# Patient Record
Sex: FEMALE | Race: WHITE | NOT HISPANIC OR LATINO | Employment: UNEMPLOYED | URBAN - METROPOLITAN AREA
[De-identification: names, ages, dates, MRNs, and addresses within clinical notes are randomized per-mention and may not be internally consistent; named-entity substitution may affect disease eponyms.]

---
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
## 2024-01-25 ENCOUNTER — OFFICE VISIT (OUTPATIENT)
Dept: URGENT CARE | Facility: CLINIC | Age: 24
End: 2024-01-25
Payer: COMMERCIAL

## 2024-01-25 VITALS
RESPIRATION RATE: 18 BRPM | HEART RATE: 101 BPM | BODY MASS INDEX: 19.63 KG/M2 | TEMPERATURE: 98 F | OXYGEN SATURATION: 97 % | DIASTOLIC BLOOD PRESSURE: 79 MMHG | HEIGHT: 64 IN | WEIGHT: 115 LBS | SYSTOLIC BLOOD PRESSURE: 121 MMHG

## 2024-01-25 DIAGNOSIS — N39.0 URINARY TRACT INFECTION WITHOUT HEMATURIA, SITE UNSPECIFIED: Primary | ICD-10-CM

## 2024-01-25 LAB
SL AMB  POCT GLUCOSE, UA: NORMAL
SL AMB LEUKOCYTE ESTERASE,UA: NORMAL
SL AMB POCT BILIRUBIN,UA: NORMAL
SL AMB POCT BLOOD,UA: NORMAL
SL AMB POCT CLARITY,UA: NORMAL
SL AMB POCT COLOR,UA: NORMAL
SL AMB POCT KETONES,UA: NORMAL
SL AMB POCT NITRITE,UA: NORMAL
SL AMB POCT PH,UA: 8.5
SL AMB POCT SPECIFIC GRAVITY,UA: 1.03
SL AMB POCT URINE HCG: NORMAL
SL AMB POCT URINE PROTEIN: NORMAL
SL AMB POCT UROBILINOGEN: 0.2

## 2024-01-25 PROCEDURE — 81002 URINALYSIS NONAUTO W/O SCOPE: CPT | Performed by: PHYSICIAN ASSISTANT

## 2024-01-25 PROCEDURE — 87591 N.GONORRHOEAE DNA AMP PROB: CPT | Performed by: PHYSICIAN ASSISTANT

## 2024-01-25 PROCEDURE — 87086 URINE CULTURE/COLONY COUNT: CPT | Performed by: PHYSICIAN ASSISTANT

## 2024-01-25 PROCEDURE — 99213 OFFICE O/P EST LOW 20 MIN: CPT | Performed by: PHYSICIAN ASSISTANT

## 2024-01-25 PROCEDURE — 87491 CHLMYD TRACH DNA AMP PROBE: CPT | Performed by: PHYSICIAN ASSISTANT

## 2024-01-25 PROCEDURE — 81025 URINE PREGNANCY TEST: CPT | Performed by: PHYSICIAN ASSISTANT

## 2024-01-25 RX ORDER — FEXOFENADINE HCL 180 MG/1
180 TABLET ORAL DAILY
COMMUNITY

## 2024-01-25 RX ORDER — SULFAMETHOXAZOLE AND TRIMETHOPRIM 800; 160 MG/1; MG/1
1 TABLET ORAL EVERY 12 HOURS SCHEDULED
Qty: 14 TABLET | Refills: 0 | Status: SHIPPED | OUTPATIENT
Start: 2024-01-25 | End: 2024-02-01

## 2024-01-25 NOTE — PROGRESS NOTES
[facility]        NAME: Chayo Salazar is a 23 y.o. female  : 2000    MRN: [id number]  DATE: 2024  TIME: 5:44 PM    Assessment and Plan   Urinary tract infection without hematuria, site unspecified [N39.0]  1. Urinary tract infection without hematuria, site unspecified  Urine culture    Chlamydia/GC amplified DNA by PCR    Chlamydia/GC amplified DNA by PCR    sulfamethoxazole-trimethoprim (BACTRIM DS) 800-160 mg per tablet    POCT urine dip    POCT urine HCG            Patient Instructions     Increase fluid intake  Take medicine as prescribed  Follow up with PCP in 3-5 days.  Proceed to  ER if symptoms worsen.  I will call you if there is a discrepancy with your urine culture    Chief Complaint     Chief Complaint   Patient presents with    Possible UTI     Urine urgency, pain in bladder with blood clots x 2 days   Took AZO.          History of Present Illness       Urinary Tract Infection   This is a new problem. Episode onset: 2 days ago. The problem has been gradually worsening. There has been no fever. Associated symptoms include chills, flank pain, frequency, hematuria (one episode of blood clot), sweats and urgency. Pertinent negatives include no discharge, hesitancy, nausea, possible pregnancy (period ended on saturday) or vomiting. Associated symptoms comments: Dull flank pain bilaterally; no new sexual partners. Treatments tried: azo. Her past medical history is significant for recurrent UTIs (per patient, seen by multiple providers with multiple diagnosises).       Review of Systems   Review of Systems   Constitutional:  Positive for chills. Negative for diaphoresis, fatigue and fever.   Gastrointestinal:  Negative for nausea and vomiting.   Genitourinary:  Positive for dysuria, flank pain, frequency, hematuria (one episode of blood clot) and urgency. Negative for difficulty urinating, dyspareunia, enuresis, hesitancy, pelvic pain, vaginal bleeding, vaginal discharge and  "vaginal pain.         Current Medications       Current Outpatient Medications:     fexofenadine (ALLEGRA) 180 MG tablet, Take 180 mg by mouth daily, Disp: , Rfl:     sulfamethoxazole-trimethoprim (BACTRIM DS) 800-160 mg per tablet, Take 1 tablet by mouth every 12 (twelve) hours for 7 days, Disp: 14 tablet, Rfl: 0    predniSONE 10 mg tablet, Take 5 pills day one, 4 pills day 2, 3 pills day 3, 2 pills day 4, 1 pills day 5 (Patient not taking: Reported on 1/25/2024), Disp: 15 tablet, Rfl: 0    Current Allergies     Allergies as of 01/25/2024 - Reviewed 01/25/2024   Allergen Reaction Noted    Penicillins Hives 08/21/2019            The following portions of the patient's history were reviewed and updated as appropriate: allergies, current medications, past family history, past medical history, past social history, past surgical history and problem list.     Past Medical History:   Diagnosis Date    Eczema        Past Surgical History:   Procedure Laterality Date    NO PAST SURGERIES         History reviewed. No pertinent family history.      Medications have been verified.        Objective   /79   Pulse 101   Temp 98 °F (36.7 °C) (Tympanic)   Resp 18   Ht 5' 4\" (1.626 m)   Wt 52.2 kg (115 lb)   LMP 01/12/2024   SpO2 97%   BMI 19.74 kg/m²   Patient's last menstrual period was 01/12/2024.       Physical Exam     Physical Exam  Constitutional:       Appearance: She is well-developed.   HENT:      Head: Normocephalic.      Right Ear: Hearing normal.      Left Ear: Hearing normal.      Nose: No mucosal edema.      Mouth/Throat:      Tonsils: No tonsillar exudate.   Cardiovascular:      Rate and Rhythm: Normal rate and regular rhythm.      Heart sounds: Normal heart sounds. No murmur heard.     No friction rub. No gallop.   Pulmonary:      Effort: Pulmonary effort is normal. No respiratory distress.      Breath sounds: Normal breath sounds. No stridor. No decreased breath sounds, wheezing, rhonchi or rales. "   Abdominal:      General: Bowel sounds are normal. There is no distension.      Palpations: Abdomen is soft. There is no mass.      Tenderness: There is abdominal tenderness in the suprapubic area. There is right CVA tenderness and left CVA tenderness (mild, diffuse flank pain). There is no guarding or rebound.   Lymphadenopathy:      Cervical: No cervical adenopathy.      Right cervical: No superficial cervical adenopathy.     Left cervical: No superficial cervical adenopathy.

## 2024-01-26 LAB
BACTERIA UR CULT: NORMAL
C TRACH DNA SPEC QL NAA+PROBE: NEGATIVE
N GONORRHOEA DNA SPEC QL NAA+PROBE: NEGATIVE

## 2024-02-24 NOTE — PROGRESS NOTES
Assessment/Plan:  Problem List Items Addressed This Visit    None  Visit Diagnoses       Recurrent UTI    -  Primary    Relevant Orders    Urine culture    Urinalysis with microscopic    US kidney and bladder    UTI symptoms        Relevant Orders    Vaginosis DNA Probe    Chlamydia/GC amplified DNA by PCR    Vaginal discharge        Relevant Orders    Vaginosis DNA Probe    Chlamydia/GC amplified DNA by PCR    Screen for STD (sexually transmitted disease)        Relevant Orders    Vaginosis DNA Probe    Chlamydia/GC amplified DNA by PCR    Dysmenorrhea        Relevant Orders    US pelvis complete w transvaginal             Patient with history of recurrent urinary tract infection symptoms.  Discussed recurrent UTI versus interstitial cystitis versus endometriosis.  STD screening was ordered including vaginitis probe to rule out infectious causes.  Urinalysis and urine culture was ordered.  Kidney, bladder ultrasound was ordered including pelvic ultrasound.  She does have a family history of endometriosis.  Discussed definitive diagnosis with diagnostic laparoscopy.  Also discussed potential referral to urology due to history of recurrent UTI and to rule out interstitial cystitis.  She declines referral for now.  Will arrange additional follow-up and testing once results are obtained.  We will call her with results and arrange next follow-up.        Subjective   Patient ID: Chayo Salazar is a 23 y.o. female.    Patient is here for a problem visit.    Chief Complaint   Patient presents with    Female  Problem     Here for a problem visit.  She reports UTI symptoms around periods.    She reports urgency, burning with bladder pain.    She has also been seeing blood in her urine.    Sexually active since age 18, has had 7 partners.  She has been with current x 3 years    PAP 7/18/23 normal  Negative GC/CT last year    Previously seen by Kamala Wyman    No urine tests    She has been getting AZO and has also been on  "antibiotics 2-3 x. States this has been reoccurring.  She has normal vaginal discharge, no odor.    She has no pain with intercourse.  She has pain with periods and also cramping without periods.    She reports regular periods, occurring every month.  She states periods are not heavy.    She is busing condoms for contraception.    Menstrual History:  OB History          0    Para   0    Term   0       0    AB   0    Living   0         SAB   0    IAB   0    Ectopic   0    Multiple   0    Live Births   0           Obstetric Comments   Menarche: 14                No LMP recorded.         Past Medical History:   Diagnosis Date    Eczema        Past Surgical History:   Procedure Laterality Date    NO PAST SURGERIES      WISDOM TOOTH EXTRACTION         Social History     Tobacco Use    Smoking status: Never    Smokeless tobacco: Never   Substance Use Topics    Alcohol use: Yes     Comment: Occasional        Allergies   Allergen Reactions    Cephalexin Other (See Comments)    Penicillins Hives         Current Outpatient Medications:     fexofenadine (ALLEGRA) 180 MG tablet, Take 180 mg by mouth daily, Disp: , Rfl:     Lactobacillus (AZO Complete Feminine Balance) CAPS, Take by mouth, Disp: , Rfl:       Review of Systems   Constitutional: Negative.    HENT: Negative.     Eyes: Negative.    Respiratory: Negative.     Cardiovascular: Negative.    Gastrointestinal: Negative.    Endocrine: Negative.    Genitourinary:         As noted in HPI   Musculoskeletal: Negative.    Skin: Negative.    Allergic/Immunologic: Negative.    Neurological: Negative.    Hematological: Negative.    Psychiatric/Behavioral: Negative.           /70 (BP Location: Left arm, Patient Position: Sitting, Cuff Size: Adult)   Pulse (!) 110   Temp 98.4 °F (36.9 °C)   Ht 5' 4.5\" (1.638 m)   Wt 57.4 kg (126 lb 9.6 oz)   SpO2 100%   BMI 21.40 kg/m²       Physical Exam  Constitutional:       General: She is not in acute distress.     " Appearance: She is well-developed.   Genitourinary:      Bladder and urethral meatus normal.      No lesions in the vagina.      Right Labia: No rash, tenderness, lesions, skin changes or Bartholin's cyst.     Left Labia: No tenderness, lesions, skin changes, Bartholin's cyst or rash.     No vaginal discharge, erythema, tenderness or bleeding.      No vaginal prolapse present.     No vaginal atrophy present.       Right Adnexa: not tender, not full and no mass present.     Left Adnexa: not tender, not full and no mass present.     No cervical polyp.      Uterus is not enlarged or tender.      No uterine mass detected.     Pelvic exam was performed with patient in the lithotomy position.   Rectum:      No tenderness.   Cardiovascular:      Rate and Rhythm: Normal rate.   Pulmonary:      Effort: Pulmonary effort is normal.   Abdominal:      General: There is no distension.      Palpations: Abdomen is soft.      Tenderness: There is no abdominal tenderness.   Neurological:      Mental Status: She is alert and oriented to person, place, and time.   Skin:     General: Skin is warm and dry.   Psychiatric:         Behavior: Behavior normal.             I have spent a total time of 30 minutes on 02/26/24 in caring for this patient including Instructions for management, Impressions, Counseling / Coordination of care, Documenting in the medical record, Reviewing / ordering tests, medicine, procedures  , Obtaining or reviewing history  , and Communicating with other healthcare professionals .       No future appointments.

## 2024-02-26 ENCOUNTER — OFFICE VISIT (OUTPATIENT)
Dept: GYNECOLOGY | Facility: CLINIC | Age: 24
End: 2024-02-26

## 2024-02-26 VITALS
HEIGHT: 65 IN | DIASTOLIC BLOOD PRESSURE: 70 MMHG | HEART RATE: 110 BPM | BODY MASS INDEX: 21.09 KG/M2 | TEMPERATURE: 98.4 F | WEIGHT: 126.6 LBS | SYSTOLIC BLOOD PRESSURE: 116 MMHG | OXYGEN SATURATION: 100 %

## 2024-02-26 DIAGNOSIS — N39.0 RECURRENT UTI: Primary | ICD-10-CM

## 2024-02-26 DIAGNOSIS — Z11.3 SCREEN FOR STD (SEXUALLY TRANSMITTED DISEASE): ICD-10-CM

## 2024-02-26 DIAGNOSIS — N89.8 VAGINAL DISCHARGE: ICD-10-CM

## 2024-02-26 DIAGNOSIS — N94.6 DYSMENORRHEA: ICD-10-CM

## 2024-02-26 DIAGNOSIS — R39.9 UTI SYMPTOMS: ICD-10-CM

## 2024-02-26 LAB
BACTERIA UR QL AUTO: ABNORMAL /HPF
BILIRUB UR QL STRIP: NEGATIVE
CLARITY UR: CLEAR
COLOR UR: COLORLESS
GLUCOSE UR STRIP-MCNC: NEGATIVE MG/DL
HGB UR QL STRIP.AUTO: NEGATIVE
KETONES UR STRIP-MCNC: NEGATIVE MG/DL
LEUKOCYTE ESTERASE UR QL STRIP: NEGATIVE
MUCOUS THREADS UR QL AUTO: ABNORMAL
NITRITE UR QL STRIP: NEGATIVE
NON-SQ EPI CELLS URNS QL MICRO: ABNORMAL /HPF
PH UR STRIP.AUTO: 7.5 [PH]
PROT UR STRIP-MCNC: NEGATIVE MG/DL
RBC #/AREA URNS AUTO: ABNORMAL /HPF
SP GR UR STRIP.AUTO: 1.01 (ref 1–1.03)
UROBILINOGEN UR STRIP-ACNC: <2 MG/DL
WBC #/AREA URNS AUTO: ABNORMAL /HPF

## 2024-02-26 PROCEDURE — 87591 N.GONORRHOEAE DNA AMP PROB: CPT | Performed by: OBSTETRICS & GYNECOLOGY

## 2024-02-26 PROCEDURE — 81001 URINALYSIS AUTO W/SCOPE: CPT | Performed by: OBSTETRICS & GYNECOLOGY

## 2024-02-26 PROCEDURE — 99203 OFFICE O/P NEW LOW 30 MIN: CPT | Performed by: OBSTETRICS & GYNECOLOGY

## 2024-02-26 PROCEDURE — 87086 URINE CULTURE/COLONY COUNT: CPT | Performed by: OBSTETRICS & GYNECOLOGY

## 2024-02-26 PROCEDURE — 87510 GARDNER VAG DNA DIR PROBE: CPT | Performed by: OBSTETRICS & GYNECOLOGY

## 2024-02-26 PROCEDURE — 87491 CHLMYD TRACH DNA AMP PROBE: CPT | Performed by: OBSTETRICS & GYNECOLOGY

## 2024-02-26 PROCEDURE — 87660 TRICHOMONAS VAGIN DIR PROBE: CPT | Performed by: OBSTETRICS & GYNECOLOGY

## 2024-02-26 PROCEDURE — 87480 CANDIDA DNA DIR PROBE: CPT | Performed by: OBSTETRICS & GYNECOLOGY

## 2024-02-26 RX ORDER — PUMPKIN SEED EXTRACT/SOY GERM 300 MG
CAPSULE ORAL
COMMUNITY

## 2024-02-27 LAB
BACTERIA UR CULT: NORMAL
C TRACH DNA SPEC QL NAA+PROBE: NEGATIVE
CANDIDA RRNA VAG QL PROBE: NOT DETECTED
G VAGINALIS RRNA GENITAL QL PROBE: DETECTED
N GONORRHOEA DNA SPEC QL NAA+PROBE: NEGATIVE
T VAGINALIS RRNA GENITAL QL PROBE: NOT DETECTED

## 2024-02-28 ENCOUNTER — TELEPHONE (OUTPATIENT)
Dept: OTHER | Facility: HOSPITAL | Age: 24
End: 2024-02-28

## 2024-02-28 DIAGNOSIS — B96.89 BV (BACTERIAL VAGINOSIS): Primary | ICD-10-CM

## 2024-02-28 DIAGNOSIS — N76.0 BV (BACTERIAL VAGINOSIS): Primary | ICD-10-CM

## 2024-02-28 RX ORDER — METRONIDAZOLE 500 MG/1
500 TABLET ORAL EVERY 12 HOURS SCHEDULED
Qty: 14 TABLET | Refills: 0 | Status: SHIPPED | OUTPATIENT
Start: 2024-02-28 | End: 2024-03-06

## 2024-05-22 ENCOUNTER — OFFICE VISIT (OUTPATIENT)
Dept: URGENT CARE | Facility: CLINIC | Age: 24
End: 2024-05-22
Payer: COMMERCIAL

## 2024-05-22 VITALS
TEMPERATURE: 97.2 F | WEIGHT: 123 LBS | OXYGEN SATURATION: 97 % | SYSTOLIC BLOOD PRESSURE: 112 MMHG | HEIGHT: 64 IN | BODY MASS INDEX: 21 KG/M2 | DIASTOLIC BLOOD PRESSURE: 70 MMHG | RESPIRATION RATE: 18 BRPM | HEART RATE: 87 BPM

## 2024-05-22 DIAGNOSIS — H92.09 EAR ACHE: ICD-10-CM

## 2024-05-22 DIAGNOSIS — H61.21 HEARING LOSS OF RIGHT EAR DUE TO CERUMEN IMPACTION: Primary | ICD-10-CM

## 2024-05-22 DIAGNOSIS — H66.90 EAR INFECTION: ICD-10-CM

## 2024-05-22 PROCEDURE — 69210 REMOVE IMPACTED EAR WAX UNI: CPT

## 2024-05-22 PROCEDURE — 99213 OFFICE O/P EST LOW 20 MIN: CPT

## 2024-05-22 RX ORDER — MULTIVITAMIN
1 TABLET ORAL DAILY
COMMUNITY

## 2024-05-22 RX ORDER — OFLOXACIN 3 MG/ML
5 SOLUTION AURICULAR (OTIC) 2 TIMES DAILY
Qty: 10 ML | Refills: 0 | Status: SHIPPED | OUTPATIENT
Start: 2024-05-22

## 2024-05-22 RX ORDER — OFLOXACIN 3 MG/ML
5 SOLUTION AURICULAR (OTIC) 2 TIMES DAILY
Qty: 10 ML | Refills: 0 | Status: SHIPPED | OUTPATIENT
Start: 2024-05-22 | End: 2024-05-22

## 2024-05-22 NOTE — PROGRESS NOTES
[facility]        NAME: Chayo Salazar is a 24 y.o. female  : 2000    MRN: [id number]  DATE: May 22, 2024  TIME: 2:32 PM    Assessment and Plan   Hearing loss of right ear due to cerumen impaction [H61.21]  1. Hearing loss of right ear due to cerumen impaction  Ear cerumen removal      2. Ear ache  ofloxacin (FLOXIN) 0.3 % otic solution    Ear cerumen removal    DISCONTINUED: ofloxacin (FLOXIN) 0.3 % otic solution      3. Ear infection  ofloxacin (FLOXIN) 0.3 % otic solution    DISCONTINUED: ofloxacin (FLOXIN) 0.3 % otic solution            Patient Instructions       Follow up with PCP in 3-5 days.  Proceed to  ER if symptoms worsen.    If tests are performed, our office will contact you with results only if changes need to made to the care plan discussed with you at the visit. You can review your full results on [facility].    Chief Complaint     Chief Complaint   Patient presents with   • Earache     Right ear pain with ringing in ears since Friday          History of Present Illness       Patient here with her mom. Patient here with right ear pain and ringing in her ear. Onset was last Friday. She just returned this puta conta. History of ear infection but reports it feels different. Patient use OTC drops without improvement.         Review of Systems   Review of Systems   Constitutional:  Negative for chills and fever.   HENT:  Positive for ear pain and tinnitus. Negative for congestion, ear discharge, rhinorrhea, sore throat and trouble swallowing.    Respiratory:  Negative for cough, chest tightness and shortness of breath.    Gastrointestinal:  Negative for abdominal pain, constipation, diarrhea, nausea and vomiting.   Skin:  Negative for color change and rash.   Neurological:  Negative for dizziness, light-headedness and headaches.         Current Medications       Current Outpatient Medications:   •  fexofenadine (ALLEGRA) 180 MG tablet, Take 180 mg by mouth daily, Disp: , Rfl:  "  •  Multiple Vitamin (multivitamin) tablet, Take 1 tablet by mouth daily, Disp: , Rfl:   •  ofloxacin (FLOXIN) 0.3 % otic solution, Administer 5 drops to the right ear 2 (two) times a day, Disp: 10 mL, Rfl: 0  •  Lactobacillus (AZO Complete Feminine Balance) CAPS, Take by mouth (Patient not taking: Reported on 5/22/2024), Disp: , Rfl:     Current Allergies     Allergies as of 05/22/2024 - Reviewed 05/22/2024   Allergen Reaction Noted   • Cephalexin Other (See Comments) 03/10/2004   • Penicillins Hives 08/21/2019            The following portions of the patient's history were reviewed and updated as appropriate: allergies, current medications, past family history, past medical history, past social history, past surgical history and problem list.     Past Medical History:   Diagnosis Date   • Eczema        Past Surgical History:   Procedure Laterality Date   • NO PAST SURGERIES     • WISDOM TOOTH EXTRACTION         Family History   Problem Relation Age of Onset   • Endometriosis Maternal Aunt          Medications have been verified.        Objective   /70   Pulse 87   Temp (!) 97.2 °F (36.2 °C) (Temporal)   Resp 18   Ht 5' 4\" (1.626 m)   Wt 55.8 kg (123 lb)   SpO2 97%   BMI 21.11 kg/m²        Physical Exam     Physical Exam  Vitals and nursing note reviewed.   Constitutional:       General: She is not in acute distress.     Appearance: Normal appearance. She is normal weight. She is not ill-appearing.   HENT:      Head: Normocephalic and atraumatic.      Right Ear: Ear canal and external ear normal. Swelling present. There is impacted cerumen.      Left Ear: Tympanic membrane, ear canal and external ear normal.      Ears:      Comments: Moderate amount of build up noted in the right canal, obscuring TM. Discussed removal. Attempted to remove manually with light curette. Full removal unsuccessful with curette. TM appears intact without perforation (the portion which is visualized). Irrigation used to " "remove remainder of build up.     Build up was dry and ribbon like. Erythema noted in the canal. Will treat with drops given abnormal build up, and erythema. Discussed with patient who is agreeable to same.      Nose: Nose normal.      Mouth/Throat:      Lips: Pink.      Mouth: Mucous membranes are moist.      Pharynx: Oropharynx is clear.   Eyes:      Extraocular Movements: Extraocular movements intact.      Conjunctiva/sclera: Conjunctivae normal.      Pupils: Pupils are equal, round, and reactive to light.   Cardiovascular:      Rate and Rhythm: Normal rate and regular rhythm.      Pulses: Normal pulses.      Heart sounds: Normal heart sounds.   Pulmonary:      Effort: Pulmonary effort is normal.      Breath sounds: Normal breath sounds.   Abdominal:      General: Abdomen is flat. Bowel sounds are normal.      Palpations: Abdomen is soft.   Musculoskeletal:         General: Normal range of motion.      Cervical back: Full passive range of motion without pain, normal range of motion and neck supple.   Skin:     General: Skin is warm.      Capillary Refill: Capillary refill takes less than 2 seconds.      Findings: No rash.   Neurological:      General: No focal deficit present.      Mental Status: She is alert and oriented to person, place, and time.   Psychiatric:         Mood and Affect: Mood normal.         Behavior: Behavior normal.         Ear cerumen removal    Date/Time: 5/22/2024 2:00 PM    Performed by: KMI Melton  Authorized by: KIM Melton  Universal Protocol:  Consent: Verbal consent obtained.  Risks and benefits: risks, benefits and alternatives were discussed  Consent given by: patient  Time out: Immediately prior to procedure a \"time out\" was called to verify the correct patient, procedure, equipment, support staff and site/side marked as required.  Patient understanding: patient states understanding of the procedure being performed  Patient consent: the patient's " understanding of the procedure matches consent given  Procedure consent: procedure consent matches procedure scheduled  Patient identity confirmed: verbally with patient    Patient location:  Clinic  Indications / Diagnosis:  Cerumen impaction  Procedure details:     Local anesthetic:  None    Location:  R ear    Procedure type: irrigation with instrumentation      Instrumentation: curette      Approach:  Natural orifice  Post-procedure details:     Complication:  None    Hearing quality:  Improved    Patient tolerance of procedure:  Tolerated well, no immediate complications  Comments:      Patient reports the muffled hearing was resolved following procedure.

## 2024-05-22 NOTE — PATIENT INSTRUCTIONS
You may take over the counter Tylenol (Acetaminophen) and/or Motrin (Ibuprofen) as needed, as directed on packaging.   Be sure to get plenty of rest, and drinking fluids to remain hydrated.     Please follow up with your primary provider in the next several days. Should you have any worsening of symptoms, or lack of improvement please be re-evaluated. If needed for significant concerns, consider 911 or ER evaluation.     Avoid Q-Tip use  Over the counter debrox drops  Follow up with ENT if symptoms persist

## 2024-11-12 ENCOUNTER — HOSPITAL ENCOUNTER (OUTPATIENT)
Dept: ULTRASOUND IMAGING | Facility: HOSPITAL | Age: 24
Discharge: HOME | End: 2024-11-12
Payer: COMMERCIAL

## 2024-11-12 DIAGNOSIS — N39.0 RECURRENT UTI: ICD-10-CM

## 2024-11-12 PROCEDURE — 76775 US EXAM ABDO BACK WALL LIM: CPT

## 2024-11-18 ENCOUNTER — RESULTS FOLLOW-UP (OUTPATIENT)
Dept: OBGYN CLINIC | Facility: CLINIC | Age: 24
End: 2024-11-18

## 2025-08-17 ENCOUNTER — APPOINTMENT (OUTPATIENT)
Dept: RADIOLOGY | Facility: CLINIC | Age: 25
End: 2025-08-17
Payer: COMMERCIAL

## 2025-08-17 ENCOUNTER — OFFICE VISIT (OUTPATIENT)
Dept: URGENT CARE | Facility: CLINIC | Age: 25
End: 2025-08-17
Payer: COMMERCIAL

## 2025-08-17 VITALS
RESPIRATION RATE: 18 BRPM | TEMPERATURE: 98 F | OXYGEN SATURATION: 98 % | WEIGHT: 139 LBS | DIASTOLIC BLOOD PRESSURE: 79 MMHG | HEIGHT: 65 IN | BODY MASS INDEX: 23.16 KG/M2 | HEART RATE: 68 BPM | SYSTOLIC BLOOD PRESSURE: 128 MMHG

## 2025-08-17 DIAGNOSIS — S99.911A RIGHT ANKLE INJURY, INITIAL ENCOUNTER: Primary | ICD-10-CM

## 2025-08-17 DIAGNOSIS — S99.911A RIGHT ANKLE INJURY, INITIAL ENCOUNTER: ICD-10-CM

## 2025-08-17 PROCEDURE — S9083 URGENT CARE CENTER GLOBAL: HCPCS

## 2025-08-17 PROCEDURE — 73610 X-RAY EXAM OF ANKLE: CPT

## 2025-08-17 PROCEDURE — G0383 LEV 4 HOSP TYPE B ED VISIT: HCPCS
